# Patient Record
(demographics unavailable — no encounter records)

---

## 2025-01-09 NOTE — HISTORY OF PRESENT ILLNESS
[FreeTextEntry1] : 33M w/ PMH of strong family hx of CAD (father  of MI in 50s, grandfather w/ CAD and CABG in the 50s), here for CV eval of CP. He has been having CP x 1-2 years, was seen at Dr. Nova's office 2 years ago for NST and was reportedly normal but recommended to have LHC given his continued sxs. He has progressive worsened cp here for eval.   Cardiac medications= none  EKG= NSR, mild repolarization changes  ECHO= pending  Stress test= pending  LHC= none  Recent hospitalization/ER visit= none  + CAD in father and grandfather  + former smoker   Assessment and Plan 1. Chest pain  2. Strong family hx of CAD   -given his continued sxs and concerning family hx, but his sxs are atypical in nature, I recommended him to have noninvasive tests first.  -scheduled for EST for EKG changes and TTE ECHO for eval of LV/RV function  -if tests are inconclusive and abnormal, will schedule for LHC  -bp ok off med  -f/u lipid panel, goal LDL <100, consider checking LpA  -trop, ddimer, bnp in the office all negative, reassured pt  During non face-to-face time, I reviewed relevant portions of the patient's medical record. During face-to-face time, I took a relevant history and examined the patient. I also explained differential diagnoses, relevant cardiac diagnoses, workup, and management plan, which required a moderate level of medical decision making. I answered all questions related to the patient's medical conditions.   Yesenia Moura MD FACC Interventional Cardiology Attending [facility] Tel: [phone] Mobile: [phone] - [place] Go: [email or web address]